# Patient Record
Sex: FEMALE | Race: BLACK OR AFRICAN AMERICAN | Employment: OTHER | URBAN - METROPOLITAN AREA
[De-identification: names, ages, dates, MRNs, and addresses within clinical notes are randomized per-mention and may not be internally consistent; named-entity substitution may affect disease eponyms.]

---

## 2024-04-30 ENCOUNTER — APPOINTMENT (OUTPATIENT)
Dept: GENERAL RADIOLOGY | Facility: HOSPITAL | Age: 29
End: 2024-04-30
Payer: COMMERCIAL

## 2024-04-30 ENCOUNTER — HOSPITAL ENCOUNTER (EMERGENCY)
Facility: HOSPITAL | Age: 29
Discharge: HOME | End: 2024-04-30
Attending: EMERGENCY MEDICINE
Payer: COMMERCIAL

## 2024-04-30 VITALS
DIASTOLIC BLOOD PRESSURE: 87 MMHG | BODY MASS INDEX: 22.16 KG/M2 | SYSTOLIC BLOOD PRESSURE: 131 MMHG | OXYGEN SATURATION: 99 % | RESPIRATION RATE: 18 BRPM | TEMPERATURE: 98 F | HEART RATE: 74 BPM | HEIGHT: 65 IN | WEIGHT: 133 LBS

## 2024-04-30 DIAGNOSIS — S72.414A CLOSED NONDISPLACED FRACTURE OF CONDYLE OF RIGHT FEMUR, INITIAL ENCOUNTER (HCC): Primary | ICD-10-CM

## 2024-04-30 PROCEDURE — 99284 EMERGENCY DEPT VISIT MOD MDM: CPT

## 2024-04-30 PROCEDURE — 73560 X-RAY EXAM OF KNEE 1 OR 2: CPT | Performed by: EMERGENCY MEDICINE

## 2024-04-30 PROCEDURE — 99283 EMERGENCY DEPT VISIT LOW MDM: CPT

## 2024-05-01 NOTE — DISCHARGE INSTRUCTIONS
Please do not bear weight with your right leg.  Use knee immobilizer and crutches.  Follow-up with Dr. Mcgee on Monday.
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
## (undated) NOTE — LETTER
Date & Time: 4/30/2024, 9:12 PM  Patient: Mayar Meeks  Encounter Provider(s):    Steve Chapman MD       To Whom It May Concern:    Mayra Meeks was seen and treated in our department on 4/30/2024. She should not return to work until seen and evaluated by Orthopaedic Specialist.  .    If you have any questions or concerns, please do not hesitate to call.        _____________________________  Physician/APC Signature